# Patient Record
Sex: FEMALE | Race: BLACK OR AFRICAN AMERICAN | NOT HISPANIC OR LATINO | URBAN - NONMETROPOLITAN AREA
[De-identification: names, ages, dates, MRNs, and addresses within clinical notes are randomized per-mention and may not be internally consistent; named-entity substitution may affect disease eponyms.]

---

## 2023-09-26 ENCOUNTER — OFFICE (OUTPATIENT)
Dept: URBAN - NONMETROPOLITAN AREA CLINIC 6 | Facility: CLINIC | Age: 43
End: 2023-09-26

## 2023-09-26 VITALS
SYSTOLIC BLOOD PRESSURE: 151 MMHG | RESPIRATION RATE: 18 BRPM | DIASTOLIC BLOOD PRESSURE: 91 MMHG | HEART RATE: 76 BPM | WEIGHT: 274 LBS | HEIGHT: 67 IN

## 2023-09-26 DIAGNOSIS — K59.00 CONSTIPATION, UNSPECIFIED: ICD-10-CM

## 2023-09-26 DIAGNOSIS — R10.13 EPIGASTRIC PAIN: ICD-10-CM

## 2023-09-26 DIAGNOSIS — K46.9 UNSPECIFIED ABDOMINAL HERNIA WITHOUT OBSTRUCTION OR GANGRENE: ICD-10-CM

## 2023-09-26 PROCEDURE — 99203 OFFICE O/P NEW LOW 30 MIN: CPT | Performed by: INTERNAL MEDICINE

## 2023-09-26 NOTE — SERVICEHPINOTES
Katrin Gallagher   is a   41 yo  female  with a past medical history of hypothyroidism and obesity who presents to establish care for epigastric pain, hernia, and constipation.  Patient reports that she has a pain in her epigastric region and reports "a gap in my stomach".  She reports that in 2020 she underwent a hernia repair by Dr. Neil Fox in [place].  She reports she was having a discomfort in her epigastric region at that time.  When she woke up the same knot that was present before the surgery remained as did the discomfort.  She reports with pressure on the area she has significant discomfort.  She recently was seen at the urgent care and reports having imaging that showed a gap in her abdominal wall with a loop of bowel herniating through.  This imaging is not available for review.  Patient does not believe she has had a CT scan.  She also reports irregular bowel habits with only moving the bowels once every 3 days.  She denies any hard stools or straining with bowel movements.  She denies bright red blood per rectum, melena, diarrhea, weight loss, nausea, vomiting, dysphagia, or odynophagia.  She denies smoking, EtOH use, or illicit drug use.  She does take ibuprofen 800 mg for menstrual cramps.  She is adopted and is unclear of her family history.

## 2023-09-26 NOTE — SERVICENOTES
Given patient's epigastric pain, hernia etc. will plan to track down her prior surgical notes to assess the operation was performed.  Will also track down recent imaging to determine whether a CT scan is needed.  Will likely refer to Dr. Bustamante/ [facility] for hernia repair.  Counseled her they may even discuss gastric sleeve or gastric bypass at the same time of procedure. She will be 43 at follow up visit and will discuss need for screening colonoscopy.